# Patient Record
Sex: MALE | Race: WHITE | Employment: FULL TIME | URBAN - METROPOLITAN AREA
[De-identification: names, ages, dates, MRNs, and addresses within clinical notes are randomized per-mention and may not be internally consistent; named-entity substitution may affect disease eponyms.]

---

## 2018-07-17 ENCOUNTER — HOSPITAL ENCOUNTER (EMERGENCY)
Facility: CLINIC | Age: 23
Discharge: HOME | End: 2018-07-17
Attending: EMERGENCY MEDICINE | Admitting: EMERGENCY MEDICINE

## 2018-07-17 VITALS
TEMPERATURE: 98.9 F | OXYGEN SATURATION: 97 % | RESPIRATION RATE: 18 BRPM | SYSTOLIC BLOOD PRESSURE: 157 MMHG | DIASTOLIC BLOOD PRESSURE: 97 MMHG

## 2018-07-17 DIAGNOSIS — L03.319 CELLULITIS AND ABSCESS OF TRUNK: ICD-10-CM

## 2018-07-17 DIAGNOSIS — L02.219 CELLULITIS AND ABSCESS OF TRUNK: ICD-10-CM

## 2018-07-17 PROCEDURE — 87070 CULTURE OTHR SPECIMN AEROBIC: CPT | Performed by: EMERGENCY MEDICINE

## 2018-07-17 PROCEDURE — 76882 US LMTD JT/FCL EVL NVASC XTR: CPT | Mod: 26 | Performed by: EMERGENCY MEDICINE

## 2018-07-17 PROCEDURE — 10060 I&D ABSCESS SIMPLE/SINGLE: CPT | Mod: Z6 | Performed by: EMERGENCY MEDICINE

## 2018-07-17 PROCEDURE — 87077 CULTURE AEROBIC IDENTIFY: CPT | Performed by: EMERGENCY MEDICINE

## 2018-07-17 PROCEDURE — 87186 SC STD MICRODIL/AGAR DIL: CPT | Performed by: EMERGENCY MEDICINE

## 2018-07-17 PROCEDURE — 99283 EMERGENCY DEPT VISIT LOW MDM: CPT | Mod: 25 | Performed by: EMERGENCY MEDICINE

## 2018-07-17 PROCEDURE — 10060 I&D ABSCESS SIMPLE/SINGLE: CPT | Performed by: EMERGENCY MEDICINE

## 2018-07-17 PROCEDURE — 25000132 ZZH RX MED GY IP 250 OP 250 PS 637: Performed by: EMERGENCY MEDICINE

## 2018-07-17 PROCEDURE — 76882 US LMTD JT/FCL EVL NVASC XTR: CPT | Performed by: EMERGENCY MEDICINE

## 2018-07-17 PROCEDURE — 99284 EMERGENCY DEPT VISIT MOD MDM: CPT | Mod: 25 | Performed by: EMERGENCY MEDICINE

## 2018-07-17 RX ORDER — CEPHALEXIN 500 MG/1
500 CAPSULE ORAL ONCE
Status: COMPLETED | OUTPATIENT
Start: 2018-07-17 | End: 2018-07-17

## 2018-07-17 RX ORDER — BUPIVACAINE HYDROCHLORIDE 5 MG/ML
30 INJECTION, SOLUTION PERINEURAL ONCE
Status: DISCONTINUED | OUTPATIENT
Start: 2018-07-17 | End: 2018-07-17 | Stop reason: HOSPADM

## 2018-07-17 RX ORDER — HYDROCODONE BITARTRATE AND ACETAMINOPHEN 5; 325 MG/1; MG/1
1 TABLET ORAL EVERY 4 HOURS PRN
Qty: 7 TABLET | Refills: 0 | Status: SHIPPED | OUTPATIENT
Start: 2018-07-17

## 2018-07-17 RX ORDER — CEPHALEXIN 500 MG/1
500 CAPSULE ORAL 2 TIMES DAILY
Qty: 10 CAPSULE | Refills: 0 | Status: SHIPPED | OUTPATIENT
Start: 2018-07-17 | End: 2018-07-22

## 2018-07-17 RX ORDER — BUPIVACAINE HYDROCHLORIDE 5 MG/ML
INJECTION, SOLUTION PERINEURAL
Status: DISCONTINUED
Start: 2018-07-17 | End: 2018-07-17 | Stop reason: HOSPADM

## 2018-07-17 RX ADMIN — CEPHALEXIN 500 MG: 500 CAPSULE ORAL at 07:42

## 2018-07-17 ASSESSMENT — ENCOUNTER SYMPTOMS
GASTROINTESTINAL NEGATIVE: 1
ALLERGIC/IMMUNOLOGIC NEGATIVE: 1
ENDOCRINE NEGATIVE: 1
HEMATOLOGIC/LYMPHATIC NEGATIVE: 1
MUSCULOSKELETAL NEGATIVE: 1
WOUND: 1
EYES NEGATIVE: 1
PSYCHIATRIC NEGATIVE: 1
RESPIRATORY NEGATIVE: 1
CONSTITUTIONAL NEGATIVE: 1
NEUROLOGICAL NEGATIVE: 1
CARDIOVASCULAR NEGATIVE: 1

## 2018-07-17 NOTE — LETTER
July 17, 2018      To Whom It May Concern:      Eloy Vinson was seen in our Emergency Department today, 07/17/18.  I expect his symptoms to improve in the next 3-5 days.  He may need to return if his symptoms worsen.       Sincerely,        Cornelius Cordova MD, FACEP

## 2018-07-17 NOTE — ED AVS SNAPSHOT
[facility] Emergency Department    [address] [facility] [address]    Phone:  [phone]    Fax:  [phone]                                       Eloy Vinson   MRN: [id number]    Department:  [facility] Emergency Department   Date of Visit:  7/17/2018           Patient Information     Date Of Birth          1995        Your diagnoses for this visit were:     Cellulitis and abscess of trunk 2 cm area of induration with erythema noted over the last 4 days.  Status post incision and drainage with pus expressed       You were seen by Torres Cordova MD.      Follow-up Information     Follow up with [facility] Emergency Department.    Specialty:  EMERGENCY MEDICINE    Why:  You will be called by the ED follow-up nurses if need for change of antibiotics based on wound culture. You will need to return to ED if you develop fever, worwsening redness or drainage as this may mean the abscess has reformed    Contact information:    [address] [place] [address] [id number] [phone]    Additional information:    The medical center is located at   [address] [place] [address] (between [address] and   [facility] in [place], four miles north   of [place]).        Discharge Instructions         Abscess (Incision & Drainage)  An abscess is sometimes called a boil. It happens when bacteria get trapped under the skin and start to grow. Pus forms inside the abscess as the body responds to the bacteria. An abscess can happen with an insect bite, ingrown hair, blocked oil gland, pimple, cyst, or puncture wound.  Your healthcare provider has drained the pus from your abscess. If the abscess pocket was large, your healthcare provider may have put in gauze packing. Your provider will need to remove it on your next visit. He or she may also replace it at that time. You may not need antibiotics to treat a simple abscess, unless the infection is spreading into the skin around the wound  (cellulitis).  The wound will take about 1 to 2 weeks to heal, depending on the size of the abscess. Healthy tissue will grow from the bottom and sides of the opening until it seals over.  Home care  These tips can help your wound heal:    The wound may drain for the first 2 days. Cover the wound with a clean dry dressing. Change the dressing if it becomes soaked with blood or pus.    If a gauze packing was placed inside the abscess pocket, you may be told to remove it yourself. You may do this in the shower. Once the packing is removed, you should wash the area in the shower, or clean the area as directed by your provider. Continue to do this until the skin opening has closed. Make sure you wash your hands after changing the packing or cleaning the wound.    If you were prescribed antibiotics, take them as directed until they are all gone.    You may use acetaminophen or ibuprofen to control pain, unless another pain medicine was prescribed. If you have liver disease or ever had a stomach ulcer, talk with your doctor before using these medicines.  Follow-up care  Follow up with your healthcare provider, or as advised. If a gauze packing was put in your wound, it should be removed in 1 to 2 days. Check your wound every day for any signs that the infection is getting worse. The signs are listed below.  When to seek medical advice  Call your healthcare provider right away if any of these occur:    Increasing redness or swelling    Red streaks in the skin leading away from the wound    Increasing local pain or swelling    Continued pus draining from the wound 2 days after treatment    Fever of 100.4 F (38 C) or higher, or as directed by your healthcare provider    Boil returns when you are at home  Date Last Reviewed: 9/1/2016 2000-2017 [employer]. [address] [place] [address], [place] [address]. All rights reserved. This information is not intended as a substitute for professional medical care. Always  follow your healthcare professional's instructions.          Discharge Instructions for Cellulitis  You have been diagnosed with cellulitis. This is an infection in the deepest layer of the skin. In some cases, the infection also affects the muscle. Cellulitis is caused by bacteria. The bacteria can enter the body through broken skin. This can happen with a cut, scratch, animal bite, or an insect bite that has been scratched. You may have been treated in the hospital with antibiotics and fluids. You will likely be given a prescription for antibiotics to take at home. This sheet will help you take care of yourself at home.  Home care  When you are home:    Take the prescribed antibiotic medicine you are given as directed until it is gone. Take it even if you feel better. It treats the infection and stops it from returning. Not taking all the medicine can make future infections hard to treat.    Keep the infected area clean.    When possible, raise the infected area above the level of your heart. This helps keep swelling down.    Talk with your healthcare provider if you are in pain. Ask what kind of over-the-counter medicine you can take for pain.    Apply clean bandages as advised.    Take your temperature once a day for a week.    Wash your hands often to prevent spreading the infection.  In the future, wash your hands before and after you touch cuts, scratches, or bandages. This will help prevent infection.   When to call your healthcare provider  Call your healthcare provider immediately if you have any of the following:    Difficulty or pain when moving the joints above or below the infected area    Discharge or pus draining from the area    Fever of 100.4 F (38 C) or higher, or as directed by your healthcare provider    Pain that gets worse in or around the infected     Redness that gets worse in or around the infected area, particularly if the area of redness expands to a wider area    Shaking  chills    Swelling of the infected area    Vomiting   Date Last Reviewed: 8/1/2016 2000-2017 [employer]. [address] [place] [address], [place] [address]. All rights reserved. This information is not intended as a substitute for professional medical care. Always follow your healthcare professional's instructions.          Discharge References/Attachments     CEPHALEXIN MONOHYDRATE ORAL TABLET (ENGLISH)    HYDROCODONE BITARTRATE, ACETAMINOPHEN ORAL TABLET (ENGLISH)      24 Hour Appointment Hotline       To make an appointment at any [facility], call [phone] ([phone]). If you don't have a family doctor or clinic, we will help you find one. [facility] clinics are conveniently located to serve the needs of you and your family.             Review of your medicines      START taking        Dose / Directions Last dose taken    cephALEXin 500 MG capsule   Commonly known as:  KEFLEX   Dose:  500 mg   Quantity:  10 capsule        Take 1 capsule (500 mg) by mouth 2 times daily for 5 days   Refills:  0        HYDROcodone-acetaminophen 5-325 MG per tablet   Commonly known as:  NORCO   Dose:  1 tablet   Quantity:  7 tablet        Take 1 tablet by mouth every 4 hours as needed for pain, moderate to severe pain or severe pain   Refills:  0          Our records show that you are taking the medicines listed below. If these are incorrect, please call your family doctor or clinic.        Dose / Directions Last dose taken    clindamycin 300 MG capsule   Commonly known as:  CLEOCIN   Dose:  300 mg   Quantity:  40 capsule        Take 1 capsule (300 mg) by mouth 4 times daily   Refills:  0        fluocinonide 0.05 % ointment   Commonly known as:  LIDEX   Quantity:  60 g        Apply  topically 3 times daily.   Refills:  1        ketorolac 10 MG tablet   Commonly known as:  TORADOL   Dose:  10 mg   Quantity:  20 tablet        Take 1 tablet (10 mg) by mouth every 6 hours as needed   Refills:  0        NO ACTIVE  MEDICATIONS        Refills:  0                Information about OPIOIDS     PRESCRIPTION OPIOIDS: WHAT YOU NEED TO KNOW   We gave you an opioid (narcotic) pain medicine. It is important to manage your pain, but opioids are not always the best choice. You should first try all the other options your care team gave you. Take this medicine for as short a time (and as few doses) as possible.     These medicines have risks:    DO NOT drive when on new or higher doses of pain medicine. These medicines can affect your alertness and reaction times, and you could be arrested for driving under the influence (DUI). If you need to use opioids long-term, talk to your care team about driving.    DO NOT operate heave machinery    DO NOT do any other dangerous activities while taking these medicines.     DO NOT drink any alcohol while taking these medicines.      If the opioid prescribed includes acetaminophen, DO NOT take with any other medicines that contain acetaminophen. Read all labels carefully. Look for the word  acetaminophen  or  Tylenol.  Ask your pharmacist if you have questions or are unsure.    You can get addicted to pain medicines, especially if you have a history of addiction (chemical, alcohol or substance dependence). Talk to your care team about ways to reduce this risk.    Store your pills in a secure place, locked if possible. We will not replace any lost or stolen medicine. If you don t finish your medicine, please throw away (dispose) as directed by your pharmacist. The [place] Pollution Control Agency has more information about safe disposal: https://www.pca.[facility].[place].us/living-green/managing-unwanted-medications.     All opioids tend to cause constipation. Drink plenty of water and eat foods that have a lot of fiber, such as fruits, vegetables, prune juice, apple juice and high-fiber cereal. Take a laxative (Miralax, milk of magnesia, Colace, Senna) if you don t move your bowels at least every other day.     "     Prescriptions were sent or printed at these locations (2 Prescriptions)                   [facility], [place] - [address]   [address] [place], [place] [address]    Telephone:  [phone]   Fax:  [phone]   Hours:                  E-Prescribed (1 of 2)         cephALEXin (KEFLEX) 500 MG capsule                 Printed at Department/Unit printer (1 of 2)         HYDROcodone-acetaminophen (NORCO) 5-325 MG per tablet                Procedures and tests performed during your visit     POC US SOFT TISSUE    Wound Culture Aerobic Bacterial      Orders Needing Specimen Collection     None      Pending Results     Date and Time Order Name Status Description    7/17/2018 0702 POC US SOFT TISSUE In process             Pending Culture Results     No orders found from 7/15/2018 to 7/18/2018.            Pending Results Instructions     If you had any lab results that were not finalized at the time of your Discharge, you can call the ED Lab Result RN at [phone]. You will be contacted by this team for any positive Lab results or changes in treatment. The nurses are available 7 days a week from 10A to 6:30P.  You can leave a message 24 hours per day and they will return your call.        Test Results From Your Hospital Stay        7/17/2018  7:02 AM      Result not yet available     Exam Begun                Thank you for choosing [facility]       Thank you for choosing [facility] for your care. Our goal is always to provide you with excellent care. Hearing back from our patients is one way we can continue to improve our services. Please take a few minutes to complete the written survey that you may receive in the mail after you visit with us. Thank you!        [employer] Information     [employer] lets you send messages to your doctor, view your test results, renew your prescriptions, schedule appointments and more. To sign up, go to [email or web address] . Click on \"Log in\" on the left side of " "the screen, which will take you to the Welcome page. Then click on \"Sign up Now\" on the right side of the page.     You will be asked to enter the access code listed below, as well as some personal information. Please follow the directions to create your username and password.     Your access code is: [email or web address]  Expires: 10/15/2018  7:42 AM     Your access code will  in 90 days. If you need help or a new code, please call your [facility] clinic or [phone].        Care EveryWhere ID     This is your Care EveryWhere ID. This could be used by other organizations to access your [facility] medical records  [id number]        Equal Access to Services     SUHAIL SOARES : Willie Brasher, erika langford, [employer], ruy kang. So [facility] [phone].    ATENCIÓN: Si habla español, tiene a zimmemran disposición servicios gratuitos de asistencia lingüística. Llame al [phone].    We comply with applicable federal civil rights laws and [place] laws. We do not discriminate on the basis of race, color, national origin, age, disability, sex, sexual orientation, or gender identity.            After Visit Summary       This is your record. Keep this with you and show to your community pharmacist(s) and doctor(s) at your next visit.                  "

## 2018-07-17 NOTE — ED PROVIDER NOTES
History     Chief Complaint   Patient presents with     Abscess     on mid lower back     HPI  Eloy Vinson is a 23 year old male  with a history of ADHD who presents for concern for a cyst on his back.  He reports about 4 days ago he noted a cyst about his back.  He reports no prior history of wound infections.  He also reports no history of MRSA, he reports no history of trauma to his back.  He takes no active medications and reports no allergies to medicines.  He reports no fever no chills.  Because he has had persistent pain and discomfort about his mid back is here in the emergency department for further care and evaluation.  He does not recall any bites or stings.    Problem List:    Patient Active Problem List    Diagnosis Date Noted     ADHD (attention deficit hyperactivity disorder) 10/13/2011     Priority: Medium     10/13/2011:he has had a history of ADHD.  Treated in the past, has not been on meds since 7th or 8th grade.           Past Medical History:    No past medical history on file.    Past Surgical History:    No past surgical history on file.    Family History:    No family history on file.    Social History:  Marital Status:  Single [1]  Social History   Substance Use Topics     Smoking status: Never Smoker     Smokeless tobacco: Never Used     Alcohol use No        Medications:      cephALEXin (KEFLEX) 500 MG capsule   HYDROcodone-acetaminophen (NORCO) 5-325 MG per tablet   clindamycin (CLEOCIN) 300 MG capsule   fluocinonide (LIDEX) 0.05 % ointment   ketorolac (TORADOL) 10 MG tablet   NO ACTIVE MEDICATIONS         Review of Systems   Constitutional: Negative.    HENT: Negative.    Eyes: Negative.    Respiratory: Negative.    Cardiovascular: Negative.    Gastrointestinal: Negative.    Endocrine: Negative.    Genitourinary: Negative.    Musculoskeletal: Negative.    Skin: Positive for wound (2cm area of induration with a necrotic center over the mid back).   Allergic/Immunologic: Negative.     Neurological: Negative.    Hematological: Negative.    Psychiatric/Behavioral: Negative.    All other systems reviewed and are negative.      Physical Exam   BP: (!) 157/97  Heart Rate: 90  Temp: 98.9  F (37.2  C)  Resp: 18  SpO2: 97 %      Physical Exam   Constitutional: He is oriented to person, place, and time. He appears well-developed and well-nourished. No distress.   HENT:   Head: Normocephalic and atraumatic.   Eyes: Conjunctivae and EOM are normal. Pupils are equal, round, and reactive to light. Right eye exhibits no discharge. Left eye exhibits no discharge. No scleral icterus.   Neck: Normal range of motion. Neck supple. No JVD present. No tracheal deviation present. No thyromegaly present.   Cardiovascular: Normal rate and regular rhythm.  Exam reveals no gallop and no friction rub.    No murmur heard.  Pulmonary/Chest: Effort normal and breath sounds normal. No stridor. No respiratory distress. He has no wheezes. He has no rales. He exhibits no tenderness.   Abdominal: Soft.   Musculoskeletal:        Lumbar back: He exhibits tenderness (area of induration with erythema and tenderness about 2cm in diameter), swelling and pain.        Back:    Lymphadenopathy:     He has no cervical adenopathy.   Neurological: He is alert and oriented to person, place, and time. He displays normal reflexes. No cranial nerve deficit. He exhibits normal muscle tone. Coordination normal.   Skin: He is not diaphoretic. There is erythema.   Psychiatric: He has a normal mood and affect. His behavior is normal. Judgment and thought content normal.           ED Course     ED Course     Procedures    Results for orders placed during the hospital encounter of 07/17/18   POC US SOFT TISSUE    Impression [facility] Procedure Note     Limited Bedside ED Ultrasound of Soft Tissue:    PROCEDURE: PERFORMED BY: Dr. Torres Cordova  INDICATIONS/SYMPTOM: Skin redness, evaluate for abscess, cellulitis or foreign body  PROBE:  High frequency linear probe  BODY LOCATION: Soft tissue located on back     FINDINGS: Cobblestoning of soft tissue: present   Hypoechoic fluid (ie abscess) identified: present measuring 1 cm   US utilized to access fluid pocket with sterile blade  INTERPRETATION:  The soft tissue and muscle layers were evaluated.      Findings indicate abscess    IMAGE DOCUMENTATION: Images were archived to PACs system.             Critical Care time:  none                     ED medications:  Medications   cephALEXin (KEFLEX) capsule 500 mg (500 mg Oral Given 7/17/18 0742)         ED labs and imaging:  Results for orders placed or performed during the hospital encounter of 07/17/18   POC US SOFT TISSUE    Impression    [facility] Procedure Note     Limited Bedside ED Ultrasound of Soft Tissue:    PROCEDURE: PERFORMED BY: Dr. Torres Cordova  INDICATIONS/SYMPTOM: Skin redness, evaluate for abscess, cellulitis or foreign body  PROBE: High frequency linear probe  BODY LOCATION: Soft tissue located on back     FINDINGS: Cobblestoning of soft tissue: present   Hypoechoic fluid (ie abscess) identified: present measuring 1 cm   US utilized to access fluid pocket with sterile blade  INTERPRETATION:  The soft tissue and muscle layers were evaluated.      Findings indicate abscess    IMAGE DOCUMENTATION: Images were archived to PACs system.   Wound Culture Aerobic Bacterial   Result Value Ref Range    Specimen Description Back Wound     Special Requests Specimen collected in eSwab transport (white cap)     Culture Micro PENDING          ED Vitals:  Vitals:    07/17/18 0656   BP: (!) 157/97   Resp: 18   Temp: 98.9  F (37.2  C)   TempSrc: Oral   SpO2: 97%     Assessments & Plan (with Medical Decision Making)   Clinical impression: Pleasant 23-year-old male who arrived alone by private car for concern for 4 day history of a cyst/lesion overlying his mid back.  Symptoms are due to abscess overlying his back with surrounding  cellulitis  Patient reports no prior history of cyst or lesions.  He reports no prior history of skin infections or MRSA.  He works for [employer], and does not recall any injury to his back or insect bites or stings.  Because the pain is progressively and steadily worsened he is here in the emergency department for further care and evaluation.  He reports no fever no chills he takes no active prescriptions and he has no allergies to medicines.  Please see photo in the physical exam section above for distribution of the lesion about his back.  He has a tender necrotic center with surrounding erythema that is painful to touch.  There is  induration about the skin.  With gentle manual pressure there is significant tenderness but no purulence or discharge appreciated.  Due to location of the lesion and degree of tenderness and erythema and progressive discomfort bedside point-of-care soft tissue ultrasound was completed      ED course and Plan:  Bedside point-of-care soft tissue ultrasound was completed to evaluate for depth of localized erythema and to evaluate for a drainable abscess pocket. Please see images in PACS.  There was a microabscesses noted over the skin with surrounding cellulitis with cobblestoning and a bedside ultrasound.  After discussing risk and benefit of an incision and drainage due to some induration with erythema patient elected to proceed with incision and drainage.  The skin was cleansed and cleaned with chlorhexidine prep.  Anesthesia with 0.5% bupivacaine 5 cc  after adequate anesthesia over at the skin stab incision was made with an 11 blade with some mucopurulent material 1/4 inch expressed with gentle manual pressure. 1/4inch gauze was placed in the abscess pocket.  Topical antibiotic was applied.  Gauze dressing with a Band-Aid was also applied.  Patient requested a dose of oral antibiotics in the ED because he was not sure if he was able to afford antibiotics for home because he only  "has \"enough money for gas for the rest of the week\".  He is discharged home with Keflex twice daily ×5 days.  We discussed signs for concern for reexamination of abscess pocket, concern for progressive wound infection.  Because it is unclear if this was a bite wound although patient reports no bites or stings we also discussed reasons to return to the ED for care.  He was given Norco for additional pain control for home.  A wound culture was sent given no prior history of MRSA with a necrotic lesion noted about the back.          Disclaimer: This note consists of symbols derived from keyboarding, dictation and/or voice recognition software. As a result, there may be errors in the script that have gone undetected. Please consider this when interpreting information found in this chart.  I have reviewed the nursing notes.    I have reviewed the findings, diagnosis, plan and need for follow up with the patient.       Discharge Medication List as of 7/17/2018  7:43 AM      START taking these medications    Details   cephALEXin (KEFLEX) 500 MG capsule Take 1 capsule (500 mg) by mouth 2 times daily for 5 days, Disp-10 capsule, R-0, E-Prescribe      HYDROcodone-acetaminophen (NORCO) 5-325 MG per tablet Take 1 tablet by mouth every 4 hours as needed for pain, moderate to severe pain or severe pain, Disp-7 tablet, R-0, Local Print             Final diagnoses:   Cellulitis and abscess of trunk - 2 cm area of induration with erythema noted over the last 4 days.  Status post incision and drainage with pus expressed       7/17/2018   [facility] EMERGENCY DEPARTMENT     Torres Cordova MD  07/17/18 5616    "

## 2018-07-17 NOTE — ED AVS SNAPSHOT
[facility] Emergency Department    [address] [address]    Phone:  [phone]    Fax:  [phone]                                       Eloy Vinson   MRN: [id number]    Department:  [facility] Emergency Department   Date of Visit:  7/17/2018           After Visit Summary Signature Page     I have received my discharge instructions, and my questions have been answered. I have discussed any challenges I see with this plan with the nurse or doctor.    ..........................................................................................................................................  Patient/Patient Representative Signature      ..........................................................................................................................................  Patient Representative Print Name and Relationship to Patient    ..................................................               ................................................  Date                                            Time    ..........................................................................................................................................  Reviewed by Signature/Title    ...................................................              ..............................................  Date                                                            Time

## 2018-07-17 NOTE — ED TRIAGE NOTES
Patient states he noticed abscess on mid back about 3 days ago now it has become painful after trying to pop it at home. Abscess red warm and has a scabbed center.

## 2018-07-19 ENCOUNTER — TELEPHONE (OUTPATIENT)
Dept: EMERGENCY MEDICINE | Facility: CLINIC | Age: 23
End: 2018-07-19

## 2018-07-19 LAB
BACTERIA SPEC CULT: ABNORMAL
Lab: ABNORMAL
SPECIMEN SOURCE: ABNORMAL

## 2018-07-19 NOTE — LETTER
July 22, 2018        Eloy Vinson  [address] [place] [id number]          Dear Eloy Vinson:    You were seen in the [facility] at [facility] DEPARTMENT on 7/17/2018.  We are unable to reach you by phone, so we are sending you this letter.     It is important that you call [facility] Emergency Department Lab Result RN at [phone] as we have to make some changes in your treatment.     Best time to call back is between 10 a.m. and 6 p.m.      Sincerely,     [facility] Emergency Department Lab Result RN  [phone]

## 2018-07-19 NOTE — TELEPHONE ENCOUNTER
[facility] Emergency Department Lab result notification:    [place] ED lab result protocol used  Wound    Reason for call  Notify of lab results, assess symptoms,  review ED providers recommendations/discharge instructions (if necessary) and advise per ED lab result f/u protocol    Lab Result  Final Wound culture report on 7/19/18  Emergency Dept discharge antibiotic prescribed: Cephalexin (Keflex) 500 mg capsule, 1 capsule (500 mg) by mouth 2 times daily for 5 days.  #1. Bacteria, methicillin-resistant Staph aureus (heavy growth), which is [RESISTANT] to antibiotic  Incision and Drainage performed in [place] ED [Yes / No]: Yes  Patient to be notified of result, symptoms's assessed and advised per [place] ED lab result protocol.  Information table from ED Provider visit on 7/17/18  Symptoms reported at ED visit (Chief complaint, HPI) Patient presents with     Abscess       on mid lower back      HPI  Eloy Vinson is a 23 year old male  with a history of ADHD who presents for concern for a cyst on his back.  He reports about 4 days ago he noted a cyst about his back.  He reports no prior history of wound infections.  He also reports no history of MRSA, he reports no history of trauma to his back.  He takes no active medications and reports no allergies to medicines.  He reports no fever no chills.  Because he has had persistent pain and discomfort about his mid back is here in the emergency department for further care and evaluation.  He does not recall any bites or stings.     ED providers Impression and Plan (applicable information) Clinical impression: Pleasant 23-year-old male who arrived alone by private car for concern for 4 day history of a cyst/lesion overlying his mid back.  Symptoms are due to abscess overlying his back with surrounding cellulitis  Patient reports no prior history of cyst or lesions.  He reports no prior history of skin infections or MRSA.  He works for [employer], and  "does not recall any injury to his back or insect bites or stings.  Because the pain is progressively and steadily worsened he is here in the emergency department for further care and evaluation.  He reports no fever no chills he takes no active prescriptions and he has no allergies to medicines.  Please see photo in the physical exam section above for distribution of the lesion about his back.  He has a tender necrotic center with surrounding erythema that is painful to touch.  There is  induration about the skin.  With gentle manual pressure there is significant tenderness but no purulence or discharge appreciated.  Due to location of the lesion and degree of tenderness and erythema and progressive discomfort bedside point-of-care soft tissue ultrasound was completed        ED course and Plan:  Bedside point-of-care soft tissue ultrasound was completed to evaluate for depth of localized erythema and to evaluate for a drainable abscess pocket. Please see images in PACS.  There was a microabscesses noted over the skin with surrounding cellulitis with cobblestoning and a bedside ultrasound.  After discussing risk and benefit of an incision and drainage due to some induration with erythema patient elected to proceed with incision and drainage.  The skin was cleansed and cleaned with chlorhexidine prep.  Anesthesia with 0.5% bupivacaine 5 cc  after adequate anesthesia over at the skin stab incision was made with an 11 blade with some mucopurulent material 1/4 inch expressed with gentle manual pressure. 1/4inch gauze was placed in the abscess pocket.  Topical antibiotic was applied.  Gauze dressing with a Band-Aid was also applied.  Patient requested a dose of oral antibiotics in the ED because he was not sure if he was able to afford antibiotics for home because he only has \"enough money for gas for the rest of the week\".  He is discharged home with Keflex twice daily ×5 days.  We discussed signs for concern for " reexamination of abscess pocket, concern for progressive wound infection.  Because it is unclear if this was a bite wound although patient reports no bites or stings we also discussed reasons to return to the ED for care.  He was given Norco for additional pain control for home.  A wound culture was sent given no prior history of MRSA with a necrotic lesion noted about the back.   Miscellaneous information Tasha ARAUJO RN Assessment (Patient s current Symptoms), include time called.  [Insert Left message here if message left]  Message left to call us back at [phone], between 10 am and 6 pm, seven days a week. May leave a message 24/7, if no one available.     PCP follow-up Questions asked: NO    Kath Sevilla RN  [place] [place] [facility] RN  Lung Nodule and ED Lab Result F/u RN  Epic pool (ED late result f/u RN): [id number]  # [phone]

## 2018-07-26 NOTE — TELEPHONE ENCOUNTER
[facility] Emergency Department Lab result notification     Patient/parent Name  Eloy    KENYETTA Assessment (Patient s current Symptoms), include time called.  [Insert Left message here if message left]  1:20 pm Pt returned our letter with a call back. He says the area on his lower back is almost healed and gets better every day. Hx of MRSA so no questions about that.     RN Recommendations/Instructions per [place] ED lab result protocol  I did advise him to f/u with pcp to make sure fully healed.      Please Contact your PCP clinic or return to the Emergency department if your:    Symptoms return.    Symptoms do not improve after 3 days on antibiotic.    Symptoms do not resolve after completing antibiotic.    Symptoms worsen or other concerning symptom's.    PCP follow-up Questions asked: YES       Kath Sevilla RN  [facility] RN  Lung Nodule and ED Lab Result F/u RN  Epic pool (ED late result f/u RN): [id number]  # [phone]

## 2019-04-30 ENCOUNTER — APPOINTMENT (OUTPATIENT)
Dept: GENERAL RADIOLOGY | Facility: CLINIC | Age: 24
End: 2019-04-30
Attending: EMERGENCY MEDICINE

## 2019-04-30 ENCOUNTER — HOSPITAL ENCOUNTER (EMERGENCY)
Facility: CLINIC | Age: 24
Discharge: HOME | End: 2019-04-30
Attending: EMERGENCY MEDICINE | Admitting: EMERGENCY MEDICINE

## 2019-04-30 VITALS
SYSTOLIC BLOOD PRESSURE: 154 MMHG | DIASTOLIC BLOOD PRESSURE: 89 MMHG | TEMPERATURE: 97.5 F | HEART RATE: 74 BPM | OXYGEN SATURATION: 99 % | WEIGHT: 280 LBS | HEIGHT: 75 IN | BODY MASS INDEX: 34.82 KG/M2

## 2019-04-30 DIAGNOSIS — S90.32XA CONTUSION OF LEFT FOOT, INITIAL ENCOUNTER: ICD-10-CM

## 2019-04-30 DIAGNOSIS — S91.312A LACERATION OF FOOT, LEFT, INITIAL ENCOUNTER: ICD-10-CM

## 2019-04-30 DIAGNOSIS — L03.119 CELLULITIS OF FOOT: ICD-10-CM

## 2019-04-30 PROCEDURE — 99283 EMERGENCY DEPT VISIT LOW MDM: CPT | Performed by: EMERGENCY MEDICINE

## 2019-04-30 PROCEDURE — 99284 EMERGENCY DEPT VISIT MOD MDM: CPT | Mod: Z6 | Performed by: EMERGENCY MEDICINE

## 2019-04-30 PROCEDURE — 73630 X-RAY EXAM OF FOOT: CPT | Mod: LT

## 2019-04-30 RX ORDER — CEPHALEXIN 500 MG/1
500 CAPSULE ORAL 4 TIMES DAILY
Qty: 28 CAPSULE | Refills: 0 | Status: SHIPPED | OUTPATIENT
Start: 2019-04-30 | End: 2019-05-07

## 2019-04-30 RX ORDER — OXYCODONE AND ACETAMINOPHEN 5; 325 MG/1; MG/1
1-2 TABLET ORAL EVERY 4 HOURS PRN
Qty: 4 TABLET | Refills: 0 | Status: SHIPPED | OUTPATIENT
Start: 2019-04-30

## 2019-04-30 RX ORDER — SULFAMETHOXAZOLE/TRIMETHOPRIM 800-160 MG
1 TABLET ORAL 2 TIMES DAILY
Qty: 14 TABLET | Refills: 0 | Status: SHIPPED | OUTPATIENT
Start: 2019-04-30 | End: 2019-05-07

## 2019-04-30 ASSESSMENT — MIFFLIN-ST. JEOR: SCORE: 2350.7

## 2019-04-30 NOTE — ED AVS SNAPSHOT
[facility] Emergency Department  [address] [place] [address]  Phone:  [phone]  Fax:  [phone]                                    Eloy Vinson   MRN: [id number]    Department:  [facility] Emergency Department   Date of Visit:  4/30/2019           After Visit Summary Signature Page    I have received my discharge instructions, and my questions have been answered. I have discussed any challenges I see with this plan with the nurse or doctor.    ..........................................................................................................................................  Patient/Patient Representative Signature      ..........................................................................................................................................  Patient Representative Print Name and Relationship to Patient    ..................................................               ................................................  Date                                   Time    ..........................................................................................................................................  Reviewed by Signature/Title    ...................................................              ..............................................  Date                                               Time          22EPIC Rev 08/18

## 2019-05-01 NOTE — ED PROVIDER NOTES
History     Chief Complaint   Patient presents with     Foot Pain     dropped a pole, 150#, on left foot 6 days ago. increased swelling and pain today     HPI  Eloy Vinson is a 23 year old male who presents the emergency department complaining of left foot pain and swelling.  Patient was placing heavy railing into an outdoor pool when he dropped 150 pound pole onto his left foot.  He suffered a superficial laceration to the base of the left toe which was thoroughly cleansed.  Patient was in bare feet at the time.  He states his foot healed well but after working in boots that got wet today he noticed that there was increased swelling and redness around patient's superficial cut and he was having increasing pain at the base of his left great toe.  Pain is worsened with activity.  He denies any fevers or chills.  He has not had any nausea or vomiting.  He denies any calf pain or increased swelling or redness of his lower legs.  He currently rates his pain a 4 out of 10 worsened with activity.    Allergies:  No Known Allergies    Problem List:    Patient Active Problem List    Diagnosis Date Noted     ADHD (attention deficit hyperactivity disorder) 10/13/2011     Priority: Medium     10/13/2011:he has had a history of ADHD.  Treated in the past, has not been on meds since 7th or 8th grade.           Past Medical History:    No past medical history on file.    Past Surgical History:    No past surgical history on file.    Family History:    No family history on file.    Social History:  Marital Status:  Single [1]  Social History     Tobacco Use     Smoking status: Never Smoker     Smokeless tobacco: Never Used   Substance Use Topics     Alcohol use: No     Drug use: No        Medications:      cephALEXin (KEFLEX) 500 MG capsule   oxyCODONE-acetaminophen (PERCOCET) 5-325 MG tablet   sulfamethoxazole-trimethoprim (BACTRIM DS) 800-160 MG tablet   clindamycin (CLEOCIN) 300 MG capsule   fluocinonide (LIDEX) 0.05 %  "ointment   HYDROcodone-acetaminophen (NORCO) 5-325 MG per tablet   ketorolac (TORADOL) 10 MG tablet   NO ACTIVE MEDICATIONS         Review of Systems  As per HPI.  Physical Exam   BP: (!) 166/103  Pulse: 74  Heart Rate: 78  Temp: 97.5  F (36.4  C)  Height: 190.5 cm (6' 3\")  Weight: 127 kg (280 lb)  SpO2: 99 %      Physical Exam   Constitutional: He appears well-developed and well-nourished. No distress.   HENT:   Head: Normocephalic.   Mouth/Throat: Oropharynx is clear and moist.   Eyes: Conjunctivae are normal.   Neck: Normal range of motion.   Pulmonary/Chest: Effort normal.   Musculoskeletal:   Left foot with superficial abrasion/laceration at the base of the left great toe.  There is mild swelling and erythema surrounding this area is tender to palpation.  There is no fluctuance noted.  No significant drainage from site.  Patient has sensation in the big toe with good capillary refill.  Able move toe without difficulty.  There is swelling on the dorsal aspect of the foot.  No erythema or swelling of ankle or left calf and there is no tenderness to palpation of left calf.  He is able to flex and extend the ankle without difficulty.   Neurological: He is alert. He exhibits normal muscle tone.   Skin: Skin is warm and dry. There is erythema.   Psychiatric: He has a normal mood and affect.   Nursing note and vitals reviewed.      ED Course        Procedures               Critical Care time:  none               Results for orders placed or performed during the hospital encounter of 04/30/19 (from the past 24 hour(s))   XR Foot Left G/E 3 Views    Narrative    FOOT THREE VIEWS LEFT  4/30/2019 10:25 PM     HISTORY: trauma to R foot    COMPARISON: None.      Impression    IMPRESSION: Soft tissue swelling at the level of the metatarsals. No  acute appearing fracture or dislocation.    NICHOLAS FORD MD       Medications - No data to display    Assessments & Plan (with Medical Decision Making) records were reviewed.  " X-ray was obtained.  No obvious fracture is noted.  There is soft tissue swelling at the level of the metatarsals.  Findings were discussed with patient.  I do not see any evidence of abscess.  There is some cellulitis surrounding and therefore me to start the patient on Keflex and also add Bactrim as the patient was walking in dirty water barefoot when the injury occurred.  I am going to give the patient off work for a day and have him elevate his foot and stay off it.  He should wash the wound with gently with soap and water and dry and apply bacitracin ointment.  If increased redness swelling or other symptoms occur he needs to return for further evaluation and care.  Patient is in agreement with this plan.     I have reviewed the nursing notes.    I have reviewed the findings, diagnosis, plan and need for follow up with the patient.          Medication List      Started    cephALEXin 500 MG capsule  Commonly known as:  KEFLEX  500 mg, Oral, 4 TIMES DAILY     oxyCODONE-acetaminophen 5-325 MG tablet  Commonly known as:  PERCOCET  1-2 tablets, Oral, EVERY 4 HOURS PRN     sulfamethoxazole-trimethoprim 800-160 MG tablet  Commonly known as:  BACTRIM DS  1 tablet, Oral, 2 TIMES DAILY            Final diagnoses:   Contusion of left foot, initial encounter   Laceration of foot, left, initial encounter - superficial   Cellulitis of foot       4/30/2019   [facility] EMERGENCY DEPARTMENT     Kurt Villasenor MD  05/01/19 9915

## 2019-05-01 NOTE — DISCHARGE INSTRUCTIONS
Return if symptoms worsen or new symptoms develop.  Follow-up with primary care physician next available.  Return to emergency department if your pain worsens or increased redness or swelling.  Elevate foot for the next few days.  Take antibiotics as directed.  Gently wash wound with soap and water and apply bacitracin ointment.

## 2019-05-01 NOTE — ED NOTES
Pt here with swelling and pain on left foot after dropping an approximately 100 lb metal pole in his foot about 1 week ago. Has a laceration from the pole, swelling increased today as well as the pain. Pt was in a swimming pool installing the pole, water was not chlorinated yet. Pt states there was drainage for the first 2 days but no drainage since.

## 2019-11-07 ENCOUNTER — HOSPITAL ENCOUNTER (EMERGENCY)
Facility: CLINIC | Age: 24
Discharge: HOME | End: 2019-11-07
Attending: PHYSICIAN ASSISTANT | Admitting: PHYSICIAN ASSISTANT

## 2019-11-07 VITALS
RESPIRATION RATE: 16 BRPM | OXYGEN SATURATION: 98 % | WEIGHT: 265 LBS | BODY MASS INDEX: 34.01 KG/M2 | HEIGHT: 74 IN | TEMPERATURE: 98.1 F | SYSTOLIC BLOOD PRESSURE: 142 MMHG | DIASTOLIC BLOOD PRESSURE: 89 MMHG

## 2019-11-07 DIAGNOSIS — B30.9 VIRAL CONJUNCTIVITIS OF BOTH EYES: ICD-10-CM

## 2019-11-07 PROCEDURE — G0463 HOSPITAL OUTPT CLINIC VISIT: HCPCS

## 2019-11-07 PROCEDURE — 99213 OFFICE O/P EST LOW 20 MIN: CPT | Mod: Z6 | Performed by: PHYSICIAN ASSISTANT

## 2019-11-07 RX ORDER — POLYMYXIN B SULFATE AND TRIMETHOPRIM 1; 10000 MG/ML; [USP'U]/ML
1-2 SOLUTION OPHTHALMIC 4 TIMES DAILY
Qty: 10 ML | Refills: 0 | Status: SHIPPED | OUTPATIENT
Start: 2019-11-07 | End: 2019-11-14

## 2019-11-07 ASSESSMENT — MIFFLIN-ST. JEOR: SCORE: 2261.78

## 2019-11-07 NOTE — ED AVS SNAPSHOT
[facility] Emergency Department  [address] [place] [address]  Phone:  [phone]  Fax:  [phone]                                    Eloy Vinson   MRN: [id number]    Department:  [facility] Emergency Department   Date of Visit:  11/7/2019           After Visit Summary Signature Page    I have received my discharge instructions, and my questions have been answered. I have discussed any challenges I see with this plan with the nurse or doctor.    ..........................................................................................................................................  Patient/Patient Representative Signature      ..........................................................................................................................................  Patient Representative Print Name and Relationship to Patient    ..................................................               ................................................  Date                                   Time    ..........................................................................................................................................  Reviewed by Signature/Title    ...................................................              ..............................................  Date                                               Time          22EPIC Rev 08/18

## 2019-11-08 NOTE — ED PROVIDER NOTES
"  History     Chief Complaint   Patient presents with     Eye Problem     eyes look pinkish     HPI  Eloy Vinson is a 24 year old male who presents for eye concerns.  He reports he has had cold symptoms with congestion, sore throat, and cough for the last week and overall that is getting better but in the last couple days his eyes started to burn and look pink.  He complains of increased tearing to the eyes but no mucus drainage.  He reports sometimes vision is blurry due to pain but otherwise is able to see okay.  Has had no fevers, no nausea or vomiting.  He tried over-the-counter lubricating drops without much relief.    Allergies:  No Known Allergies    Problem List:    Patient Active Problem List    Diagnosis Date Noted     ADHD (attention deficit hyperactivity disorder) 10/13/2011     Priority: Medium     10/13/2011:he has had a history of ADHD.  Treated in the past, has not been on meds since 7th or 8th grade.           Past Medical History:    History reviewed. No pertinent past medical history.    Past Surgical History:    History reviewed. No pertinent surgical history.    Family History:    No family history on file.    Social History:  Marital Status:  Single [1]  Social History     Tobacco Use     Smoking status: Never Smoker     Smokeless tobacco: Never Used   Substance Use Topics     Alcohol use: No     Drug use: No        Medications:    trimethoprim-polymyxin b (POLYTRIM) 93389-9.1 UNIT/ML-% ophthalmic solution  fluocinonide (LIDEX) 0.05 % ointment  HYDROcodone-acetaminophen (NORCO) 5-325 MG per tablet  ketorolac (TORADOL) 10 MG tablet  NO ACTIVE MEDICATIONS  oxyCODONE-acetaminophen (PERCOCET) 5-325 MG tablet          Review of Systems   All other systems reviewed and are negative.      Physical Exam   BP: (!) 142/89  Heart Rate: 90  Temp: 98.1  F (36.7  C)  Resp: 16  Height: 188 cm (6' 2\")  Weight: 120.2 kg (265 lb)  SpO2: 98 %      Physical Exam  Vitals signs and nursing note reviewed. "   Constitutional:       General: He is not in acute distress.     Appearance: Normal appearance. He is obese. He is not ill-appearing, toxic-appearing or diaphoretic.   HENT:      Head: Normocephalic and atraumatic.      Right Ear: Tympanic membrane normal.      Left Ear: Tympanic membrane normal.      Nose: Nose normal.      Mouth/Throat:      Mouth: Mucous membranes are moist.   Eyes:      General: Lids are normal.         Right eye: No foreign body or discharge.         Left eye: No foreign body or discharge.      Extraocular Movements: Extraocular movements intact.      Conjunctiva/sclera:      Right eye: Right conjunctiva is injected.      Left eye: Left conjunctiva is injected.      Pupils: Pupils are equal, round, and reactive to light.   Neck:      Musculoskeletal: Neck supple.   Cardiovascular:      Rate and Rhythm: Normal rate and regular rhythm.      Heart sounds: Normal heart sounds.   Pulmonary:      Effort: Pulmonary effort is normal. No respiratory distress.      Breath sounds: Normal breath sounds.   Musculoskeletal:         General: No deformity.   Skin:     General: Skin is warm and dry.   Neurological:      General: No focal deficit present.      Mental Status: He is alert and oriented to person, place, and time. Mental status is at baseline.   Psychiatric:         Mood and Affect: Mood normal.         Behavior: Behavior normal.         ED Course        Procedures      No results found for this or any previous visit (from the past 24 hour(s)).    Medications - No data to display    Assessments & Plan (with Medical Decision Making)  Eloy Vinson is a 24 year old male who presented complaining of bilateral eye pain, redness, and tearing.  Recent URI symptoms that are slowly improve he is afebrile on arrival with unremarkable vital signs.  Exam notable for conjunctival injection bilaterally with no evidence of green mucoid discharge from the eyes.  Pupils ERRL, EOM intact.  Rest of exam was  benign.  Based on symptomology and exam I suspect he has conjunctivitis, given recent URI and lack of significant mucoid drainage, I think this case is viral.  Discussed management options with the patient and he was agreeable to continuing with over-the-counter lubricating drops, cold compresses to the eyes, proper hand hygiene to prevent spread of infection.  Discussed with him that symptoms may progress into a bacterial conjunctivitis and discussed what to look for, in which case I provided a paper prescription of Polytrim drops to be filled at that time if needed.  He was otherwise provided instructions on when to return to the ED.  All questions answered and patient discharged home in suitable condition.     I have reviewed the nursing notes.    I have reviewed the findings, diagnosis, plan and need for follow up with the patient.    Discharge Medication List as of 11/7/2019  7:09 PM      START taking these medications    Details   trimethoprim-polymyxin b (POLYTRIM) 81201-9.1 UNIT/ML-% ophthalmic solution Place 1-2 drops into both eyes 4 times daily for 7 days, Disp-10 mL, R-0, Local Print             Final diagnoses:   Viral conjunctivitis of both eyes     Note: Chart documentation done in part with Dragon Voice Recognition software. Although reviewed after completion, some word and grammatical errors may remain.    11/7/2019   [facility] EMERGENCY DEPARTMENT     Jessica Agarwal PA-C  11/07/19 1914

## 2019-11-08 NOTE — DISCHARGE INSTRUCTIONS
"Please try over-the-counter lubricating drops for \"pinkeye.\"  This can help your symptoms.  Wash the eyes with cold wet washcloth and practice good hand hygiene to prevent spread of infection.  If you start to have more mucus drainage from the eyes this could be a sign of a bacterial infection and get the prescription filled at that time to start treating with antibiotic eyedrops.  If you develop any worsening concerns please return to the emergency department.  "

## 2021-03-31 NOTE — DISCHARGE INSTRUCTIONS
Abscess (Incision & Drainage)  An abscess is sometimes called a boil. It happens when bacteria get trapped under the skin and start to grow. Pus forms inside the abscess as the body responds to the bacteria. An abscess can happen with an insect bite, ingrown hair, blocked oil gland, pimple, cyst, or puncture wound.  Your healthcare provider has drained the pus from your abscess. If the abscess pocket was large, your healthcare provider may have put in gauze packing. Your provider will need to remove it on your next visit. He or she may also replace it at that time. You may not need antibiotics to treat a simple abscess, unless the infection is spreading into the skin around the wound (cellulitis).  The wound will take about 1 to 2 weeks to heal, depending on the size of the abscess. Healthy tissue will grow from the bottom and sides of the opening until it seals over.  Home care  These tips can help your wound heal:    The wound may drain for the first 2 days. Cover the wound with a clean dry dressing. Change the dressing if it becomes soaked with blood or pus.    If a gauze packing was placed inside the abscess pocket, you may be told to remove it yourself. You may do this in the shower. Once the packing is removed, you should wash the area in the shower, or clean the area as directed by your provider. Continue to do this until the skin opening has closed. Make sure you wash your hands after changing the packing or cleaning the wound.    If you were prescribed antibiotics, take them as directed until they are all gone.    You may use acetaminophen or ibuprofen to control pain, unless another pain medicine was prescribed. If you have liver disease or ever had a stomach ulcer, talk with your doctor before using these medicines.  Follow-up care  Follow up with your healthcare provider, or as advised. If a gauze packing was put in your wound, it should be removed in 1 to 2 days. Check your wound every day for any  Wheelchair/Stroller signs that the infection is getting worse. The signs are listed below.  When to seek medical advice  Call your healthcare provider right away if any of these occur:    Increasing redness or swelling    Red streaks in the skin leading away from the wound    Increasing local pain or swelling    Continued pus draining from the wound 2 days after treatment    Fever of 100.4 F (38 C) or higher, or as directed by your healthcare provider    Boil returns when you are at home  Date Last Reviewed: 9/1/2016 2000-2017 [employer]. [address] [place] [address]. All rights reserved. This information is not intended as a substitute for professional medical care. Always follow your healthcare professional's instructions.          Discharge Instructions for Cellulitis  You have been diagnosed with cellulitis. This is an infection in the deepest layer of the skin. In some cases, the infection also affects the muscle. Cellulitis is caused by bacteria. The bacteria can enter the body through broken skin. This can happen with a cut, scratch, animal bite, or an insect bite that has been scratched. You may have been treated in the hospital with antibiotics and fluids. You will likely be given a prescription for antibiotics to take at home. This sheet will help you take care of yourself at home.  Home care  When you are home:    Take the prescribed antibiotic medicine you are given as directed until it is gone. Take it even if you feel better. It treats the infection and stops it from returning. Not taking all the medicine can make future infections hard to treat.    Keep the infected area clean.    When possible, raise the infected area above the level of your heart. This helps keep swelling down.    Talk with your healthcare provider if you are in pain. Ask what kind of over-the-counter medicine you can take for pain.    Apply clean bandages as advised.    Take your temperature once a day for a  week.    Wash your hands often to prevent spreading the infection.  In the future, wash your hands before and after you touch cuts, scratches, or bandages. This will help prevent infection.   When to call your healthcare provider  Call your healthcare provider immediately if you have any of the following:    Difficulty or pain when moving the joints above or below the infected area    Discharge or pus draining from the area    Fever of 100.4 F (38 C) or higher, or as directed by your healthcare provider    Pain that gets worse in or around the infected     Redness that gets worse in or around the infected area, particularly if the area of redness expands to a wider area    Shaking chills    Swelling of the infected area    Vomiting   Date Last Reviewed: 8/1/2016 2000-2017 [employer]. [address] [place] [address], [place] [address]. All rights reserved. This information is not intended as a substitute for professional medical care. Always follow your healthcare professional's instructions.

## 2021-04-09 ENCOUNTER — HOSPITAL ENCOUNTER (EMERGENCY)
Facility: CLINIC | Age: 26
Discharge: HOME | End: 2021-04-09
Attending: NURSE PRACTITIONER | Admitting: NURSE PRACTITIONER

## 2021-04-09 VITALS
WEIGHT: 265 LBS | RESPIRATION RATE: 18 BRPM | BODY MASS INDEX: 34.02 KG/M2 | DIASTOLIC BLOOD PRESSURE: 92 MMHG | OXYGEN SATURATION: 98 % | HEART RATE: 96 BPM | TEMPERATURE: 98.6 F | SYSTOLIC BLOOD PRESSURE: 156 MMHG

## 2021-04-09 DIAGNOSIS — S61.012A THUMB LACERATION, LEFT, INITIAL ENCOUNTER: ICD-10-CM

## 2021-04-09 PROCEDURE — G0463 HOSPITAL OUTPT CLINIC VISIT: HCPCS | Performed by: NURSE PRACTITIONER

## 2021-04-09 PROCEDURE — 12001 RPR S/N/AX/GEN/TRNK 2.5CM/<: CPT | Performed by: NURSE PRACTITIONER

## 2021-04-09 PROCEDURE — 99213 OFFICE O/P EST LOW 20 MIN: CPT | Mod: 25 | Performed by: NURSE PRACTITIONER

## 2021-04-09 ASSESSMENT — ENCOUNTER SYMPTOMS
SORE THROAT: 0
EYES NEGATIVE: 1
CARDIOVASCULAR NEGATIVE: 1
RHINORRHEA: 1
NEUROLOGICAL NEGATIVE: 1
RESPIRATORY NEGATIVE: 1
CONSTITUTIONAL NEGATIVE: 1
GASTROINTESTINAL NEGATIVE: 1

## 2021-04-09 NOTE — ED PROVIDER NOTES
History     Chief Complaint   Patient presents with     Laceration     Pt was cutting up a pig and got his L thumb in the way. Pt cut the tip of his L thumb. bleeding controlled.     HPI  Eloy Vinson is a 25 year old male with a hx of mrsa who was butchering a pig today when his knife slipped and he cut his thumb. This happened two hours ago. Most recent tetanus was in 2016.     Allergies:  No Known Allergies    Problem List:    Patient Active Problem List    Diagnosis Date Noted     ADHD (attention deficit hyperactivity disorder) 10/13/2011     Priority: Medium     10/13/2011:he has had a history of ADHD.  Treated in the past, has not been on meds since 7th or 8th grade.           Past Medical History:    History reviewed. No pertinent past medical history.    Past Surgical History:    History reviewed. No pertinent surgical history.    Family History:    History reviewed. No pertinent family history.    Social History:  Marital Status:  Single [1]  Social History     Tobacco Use     Smoking status: Never Smoker     Smokeless tobacco: Never Used   Substance Use Topics     Alcohol use: No     Drug use: No        Medications:    amoxicillin-clavulanate (AUGMENTIN) 875-125 MG tablet  fluocinonide (LIDEX) 0.05 % ointment  HYDROcodone-acetaminophen (NORCO) 5-325 MG per tablet  ketorolac (TORADOL) 10 MG tablet  NO ACTIVE MEDICATIONS  oxyCODONE-acetaminophen (PERCOCET) 5-325 MG tablet          Review of Systems   Constitutional: Negative.    HENT: Positive for postnasal drip and rhinorrhea (allergies). Negative for sore throat.    Eyes: Negative.    Respiratory: Negative.    Cardiovascular: Negative.    Gastrointestinal: Negative.    Genitourinary: Negative.    Neurological: Negative.        Physical Exam   BP: (!) 156/92  Pulse: 96  Temp: 98.6  F (37  C)  Resp: 18  Weight: 120.2 kg (265 lb)  SpO2: 98 %      Physical Exam  Constitutional:       General: He is not in acute distress.     Appearance: Normal  appearance. He is obese. He is not ill-appearing.   HENT:      Head: Normocephalic and atraumatic.   Pulmonary:      Effort: Pulmonary effort is normal.   Skin:     General: Skin is warm.      Comments: See image below   Neurological:      Mental Status: He is alert.             ED Course        [facility]    -Laceration Repair    Date/Time: 4/9/2021 4:24 PM  Performed by: Qamar Lockhart APRN CNP  Authorized by: Qamar Lockhart APRN CNP       ANESTHESIA (see MAR for exact dosages):     Anesthesia method:  Local infiltration and nerve block    Local anesthetic:  Lidocaine 1% w/o epi    Block location:  Digital and local    Block needle gauge:  30 G    Block injection procedure:  Negative aspiration for blood, anatomic landmarks palpated, introduced needle and incremental injection  LACERATION DETAILS     Location:  Finger    Finger location:  L thumb    Length (cm):  2    Depth (mm):  4  EXPLORATION:     Hemostasis achieved with:  Direct pressure    Wound exploration: wound explored through full range of motion and entire depth of wound probed and visualized      Wound extent: fascia not violated, no foreign body, no signs of injury, no nerve damage, no underlying fracture and no vascular damage      Contaminated: no      TREATMENT:     Area cleansed with:  Hibiclens    Amount of cleaning:  Standard    Irrigation solution:  Tap water    Irrigation volume:  400    Irrigation method:  Tap    Visualized foreign bodies/material removed: no      SKIN REPAIR     Repair method:  Sutures    Suture size:  4-0    Suture material:  Nylon    Suture technique:  Simple interrupted    Number of sutures:  6    APPROXIMATION     Approximation:  Close    POST-PROCEDURE DETAILS     Dressing:  Antibiotic ointment and bulky dressing      PROCEDURE   Patient Tolerance:  Patient tolerated the procedure well with no immediate complications                     No results found for this or any  previous visit (from the past 24 hour(s)).    Medications - No data to display    Assessments & Plan (with Medical Decision Making)   Laceration while butchering pig, patient plans to go back and finish the job wearing rubber gloves and leather outer. I said that I couldn't recommend that but I understand that patient will do this in either case; thus, sent with antibiotic for 3 days as prophylactic. Patient to return to ED if he develops infection or if he bursts his stitches.    Return in 10 days for suture removal.    I have reviewed the nursing notes.    I have reviewed the findings, diagnosis, plan and need for follow up with the patient.      New Prescriptions    AMOXICILLIN-CLAVULANATE (AUGMENTIN) 875-125 MG TABLET    Take 1 tablet by mouth 2 times daily for 6 doses       Final diagnoses:   Thumb laceration, left, initial encounter       4/9/2021   [facility] EMERGENCY DEPT     Qamar Lockhart, APRN CNP  04/09/21 [id number]

## 2024-10-30 ENCOUNTER — OFFICE VISIT (OUTPATIENT)
Dept: FAMILY MEDICINE | Facility: CLINIC | Age: 29
End: 2024-10-30

## 2024-10-30 VITALS
DIASTOLIC BLOOD PRESSURE: 95 MMHG | HEART RATE: 78 BPM | OXYGEN SATURATION: 97 % | RESPIRATION RATE: 16 BRPM | WEIGHT: 315 LBS | BODY MASS INDEX: 42.7 KG/M2 | TEMPERATURE: 98.3 F | SYSTOLIC BLOOD PRESSURE: 153 MMHG

## 2024-10-30 DIAGNOSIS — L98.9 SKIN LESION: Primary | ICD-10-CM

## 2024-10-30 PROCEDURE — 11102 TANGNTL BX SKIN SINGLE LES: CPT | Performed by: STUDENT IN AN ORGANIZED HEALTH CARE EDUCATION/TRAINING PROGRAM

## 2024-10-30 PROCEDURE — 88305 TISSUE EXAM BY PATHOLOGIST: CPT | Performed by: DERMATOLOGY

## 2024-10-30 PROCEDURE — 11103 TANGNTL BX SKIN EA SEP/ADDL: CPT | Performed by: STUDENT IN AN ORGANIZED HEALTH CARE EDUCATION/TRAINING PROGRAM

## 2024-10-30 ASSESSMENT — PAIN SCALES - GENERAL: PAINLEVEL_OUTOF10: NO PAIN (0)

## 2024-10-30 NOTE — PROGRESS NOTES
Assessment & Plan     Skin lesion  > patient tolerated shave biopsy without complication (see proc note below)    - Dermatological Path Order and Indications; Standing  - Dermatological Path Order and Indications        Subjective   Eloy is a 29 year old, presenting for the following health issues:  Mole (2 moles that have changed, one on his left side of face has changed in size and one on right hip has changed in color and size )      10/30/2024     2:16 PM   Additional Questions   Roomed by Elvira LONDONO     History of Present Illness       Reason for visit:  Check for possible skin cancer  Symptom onset:  3-4 weeks ago  Symptoms include:  Odd growth from existing moles  Symptom intensity:  Mild  Symptom progression:  Worsening  Had these symptoms before:  No  What makes it worse:  No  What makes it better:  No   He is taking medications regularly.       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  Physical Exam  Constitutional:       General: He is not in acute distress.      Appearance: Normal appearance.   HENT:      Head: Normocephalic.   Eyes:      General: No scleral icterus.        Right eye: No discharge.         Left eye: No discharge.      Extraocular Movements: Extraocular movements intact.      Conjunctiva/sclera: Conjunctivae normal.   Pulmonary:      Effort: Pulmonary effort is normal. No respiratory distress.   Musculoskeletal:         General: Normal range of motion.      Cervical back: Normal range of motion.   Skin:     General: Skin is warm.      Comments: See images below of skin lesions    Neurological:      General: No focal deficit present.      Mental Status: He is alert and oriented to person, place, and time.   Psychiatric:         Mood and Affect: Mood normal.         Behavior: Behavior normal.                      SHAVE BIOPSY PROCEDURE NOTE: After written informed consent was obtained, a time out was taken to identify the patient and the correct site for biopsy. The lesion on the left lateral temporal area and the right lower abdominal area were cleansed with a 70% isopropyl alcohol wipe, and then injected with 1-2cc of lidocaine 1% with epinephrine 1:100,000 at each site. Once anesthesia was ensured, the visible surfaces of the lesions were biopsied using a [place] blade in standard technique. Hemostasis was obtained with pressure and aluminum chloride 20% solution. The specimens were placed in labeled formalin containers and sent to pathology for sectioning and analysis. The wounds were dressed with bacitracin and an adhesive bandage. The patient tolerated the procedure well. Post-procedure instructions and recommendations were provided.            Signed Electronically by: LEIGHTON CEBALLOS MD

## 2024-11-01 LAB
PATH REPORT.COMMENTS IMP SPEC: NORMAL
PATH REPORT.COMMENTS IMP SPEC: NORMAL
PATH REPORT.FINAL DX SPEC: NORMAL
PATH REPORT.GROSS SPEC: NORMAL
PATH REPORT.MICROSCOPIC SPEC OTHER STN: NORMAL
PATH REPORT.RELEVANT HX SPEC: NORMAL

## 2024-11-04 NOTE — RESULT ENCOUNTER NOTE
Lester Vinson,     It is a pleasure providing you with medical care. I have received and reviewed your results, and have the following recommendations:     Your path report showed you had intradermal melanocytic nevi which are non cancerous/benign.       Sincerely,     Olivia Peres MD